# Patient Record
Sex: FEMALE | Race: WHITE | Employment: FULL TIME | URBAN - METROPOLITAN AREA
[De-identification: names, ages, dates, MRNs, and addresses within clinical notes are randomized per-mention and may not be internally consistent; named-entity substitution may affect disease eponyms.]

---

## 2017-09-02 ENCOUNTER — HOSPITAL ENCOUNTER (OUTPATIENT)
Dept: CT IMAGING | Age: 57
Discharge: HOME | End: 2017-09-02
Attending: INTERNAL MEDICINE
Payer: COMMERCIAL

## 2017-09-02 DIAGNOSIS — C50.511 MALIGNANT NEOPLASM OF LOWER-OUTER QUADRANT OF RIGHT FEMALE BREAST (HCC): ICD-10-CM

## 2017-09-02 PROCEDURE — 74177 CT ABD & PELVIS W/CONTRAST: CPT

## 2017-09-02 PROCEDURE — 74011636320 HC RX REV CODE- 636/320: Performed by: INTERNAL MEDICINE

## 2017-09-02 RX ADMIN — IOPAMIDOL 97 ML: 755 INJECTION, SOLUTION INTRAVENOUS at 10:00

## 2018-02-06 ENCOUNTER — HOSPITAL ENCOUNTER (OUTPATIENT)
Dept: MAMMOGRAPHY | Age: 58
Discharge: HOME | End: 2018-02-06
Attending: INTERNAL MEDICINE
Payer: COMMERCIAL

## 2018-02-06 DIAGNOSIS — C50.511 MALIGNANT NEOPLASM OF LOWER-OUTER QUADRANT OF RIGHT FEMALE BREAST (HCC): ICD-10-CM

## 2018-02-06 PROCEDURE — 77063 BREAST TOMOSYNTHESIS BI: CPT

## 2018-02-24 ENCOUNTER — HOSPITAL ENCOUNTER (OUTPATIENT)
Dept: ULTRASOUND IMAGING | Age: 58
Discharge: HOME | End: 2018-02-24
Attending: INTERNAL MEDICINE
Payer: COMMERCIAL

## 2018-02-24 DIAGNOSIS — K21.9 GERD (GASTROESOPHAGEAL REFLUX DISEASE): ICD-10-CM

## 2018-02-24 DIAGNOSIS — Z87.19 HISTORY OF PANCREATITIS: ICD-10-CM

## 2018-02-24 DIAGNOSIS — R10.9 ABDOMINAL PAIN: ICD-10-CM

## 2018-02-24 DIAGNOSIS — C50.919 BREAST CANCER (HCC): ICD-10-CM

## 2018-02-24 PROCEDURE — 76705 ECHO EXAM OF ABDOMEN: CPT

## 2018-10-15 ENCOUNTER — OFFICE VISIT (OUTPATIENT)
Dept: SURGERY | Age: 58
End: 2018-10-15

## 2018-10-15 VITALS
HEIGHT: 66 IN | HEART RATE: 68 BPM | SYSTOLIC BLOOD PRESSURE: 115 MMHG | WEIGHT: 148 LBS | BODY MASS INDEX: 23.78 KG/M2 | DIASTOLIC BLOOD PRESSURE: 72 MMHG

## 2018-10-15 DIAGNOSIS — R07.89 CHEST WALL PAIN: Primary | ICD-10-CM

## 2018-10-15 DIAGNOSIS — Z85.3 HISTORY OF RIGHT BREAST CANCER: ICD-10-CM

## 2018-10-15 RX ORDER — METOPROLOL SUCCINATE 25 MG/1
TABLET, EXTENDED RELEASE ORAL
Refills: 11 | COMMUNITY
Start: 2018-08-01

## 2018-10-15 NOTE — PROGRESS NOTES
HISTORY OF PRESENT ILLNESS  Braulio Barrett is a 65 y.o. female. HPI  Patient consult here for RIGHT breast pain and lump. Patient had BILATERAL breast pain about a month ago. The LEFT breast pain went away but the RIGHT breast pain continued. The pain is a 10/10 when she has it. The pain comes and goes. She noticed a lump to the RIGHT breast about 1 1/2 weeks ago. The lump is not hard but is painful to touch. No redness. Denies nipple discharge but has noticed the RIGHT nipple shape has changed.      09: RIGHT lumpectomy and SLNBx. PATH: 1.1cm infiltrating mammary carcinoma with mixed ductal and lobular features, grade II, ER+(100%)/MN+(100%)/HER2-, negative margins, 1/5 LNs involved. DCIS present, ER-/MN-. Pt is still taking Tamoxifen as of Oct. 2018.     Denies family history of breast or ovarian cancer. Mammogram, 18, BIRADS 1    Past Medical History:   Diagnosis Date    Arrhythmia     palpitations    Breast cancer ([facility] [address]     r-idc chemo    Cancer ([facility] [address] 3/09    RIGHT breast chemo and radiation    Depression     Radiation therapy complication 09    & chemo       Past Surgical History:   Procedure Laterality Date    HX BREAST LUMPECTOMY  3/09    RIGHT -idc chemo    HX  SECTION      x2    HX HYSTERECTOMY  2016    HX OOPHORECTOMY  2016       Social History     Social History    Marital status:      Spouse name: N/A    Number of children: N/A    Years of education: N/A     Occupational History    Not on file.      Social History Main Topics    Smoking status: Never Smoker    Smokeless tobacco: Never Used    Alcohol use 0.5 oz/week     1 Standard drinks or equivalent per week    Drug use: No    Sexual activity: Yes     Partners: Male     Other Topics Concern    Not on file     Social History Narrative       Current Outpatient Prescriptions on File Prior to Visit   Medication Sig Dispense Refill    tamoxifen (NOLVADEX) 20 mg tablet Take 20 mg by mouth daily. No current facility-administered medications on file prior to visit. Allergies   Allergen Reactions    Percocet [Oxycodone-Acetaminophen] Swelling     Swelling of the tongue. Tolerates all other opiods. OB History      Para Term  AB Living    2 2    2    SAB TAB Ectopic Molar Multiple Live Births                 ROS  Constitutional: Negative. HENT: Negative. Eyes: Negative. Respiratory: Negative. Cardiovascular: Negative. Gastrointestinal: Negative. Genitourinary: Negative. Musculoskeletal: Negative. Skin: Negative. Neurological: Negative. Endo/Heme/Allergies: Negative. Psychiatric/Behavioral: Negative. Physical Exam   Cardiovascular: Normal rate, regular rhythm and normal heart sounds. Pulmonary/Chest: Breath sounds normal. Right breast exhibits no inverted nipple, no mass, no nipple discharge, no skin change and no tenderness. Left breast exhibits no inverted nipple, no mass, no nipple discharge, no skin change and no tenderness. Breasts are symmetrical.   Lymphadenopathy:        Right cervical: No superficial cervical, no deep cervical and no posterior cervical adenopathy present. Left cervical: No superficial cervical, no deep cervical and no posterior cervical adenopathy present. She has no axillary adenopathy. Right axillary: No pectoral and no lateral adenopathy present. Left axillary: No pectoral and no lateral adenopathy present. BREAST ULTRASOUND  Indication: Right  Breast pain  Technique: The area was scanned using a high-frequency linear-array near-field transducer  Findings: No abnormal mass, lesion, or shadowing noted. No cysts  Impression: Normal breast tissue   Disposition: No worrisome finding on ultrasound    ASSESSMENT and PLAN    ICD-10-CM ICD-9-CM    1. Chest wall pain R07.89 786.52    2.  History of right breast cancer Z85.3 V10.3       New patient with hx of RIGHT breast cancer presents for evaluation of RIGHT breast pain near lumpectomy scar. Physical exam today normal. Well healed incision s/p lumpectomy no evidence of local recurrence. No worrisome findings on US. Pain recreated on exam, which is chest wall pain, likely from XRT boost. Assured pt that this is not related to her breast cancer. Pt notes allergy to NSAIDs, but thinks she can take Aleve. Advised her to treat for a few days with Aleve. F/U PRN. This plan was reviewed with the patient and patient agrees. All questions were answered.     Written by Castillo Hauser, as dictated by Dr. Tawana Wylie MD.

## 2018-10-15 NOTE — PROGRESS NOTES
HISTORY OF PRESENT ILLNESS Azar Mallory is a 65 y.o. female. HPI  Patient consult here for RIGHT breast pain and lump. Patient had BILATERAL breast pain about a month ago. The LEFT breast pain went away but the RIGHT breast pain continued. The pain is a 10/10 when she has it. The pain comes and goes. She noticed a lump to the RIGHT breast about 1 1/2 weeks ago. The lump is not hard but is painful to touch. No redness. Denies nipple discharge but has noticed the RIGHT nipple shape has changed. 03/03/09: RIGHT lumpectomy and SLNBx. PATH: 1.1cm infiltrating mammary carcinoma with mixed ductal and lobular features, grade II, ER+(100%)/AL+(100%)/HER2-, negative margins, 1/5 LNs involved. DCIS present, ER-/AL-. Denies family history of breast or ovarian cancer. Mammogram, 2/6/18, BIRADS 1 Review of Systems Constitutional: Negative. HENT: Negative. Eyes: Negative. Respiratory: Negative. Cardiovascular: Negative. Gastrointestinal: Negative. Genitourinary: Negative. Musculoskeletal: Negative. Skin: Negative. Neurological: Negative. Endo/Heme/Allergies: Negative. Psychiatric/Behavioral: Negative. Physical Exam 
 
ASSESSMENT and PLAN 
{ASSESSMENT/PLAN:16719}

## 2018-10-15 NOTE — PATIENT INSTRUCTIONS
Breast Self-Exam: Care Instructions  Your Care Instructions    A breast self-exam is when you check your breasts for lumps or changes. This regular exam helps you learn how your breasts normally look and feel. Most breast problems or changes are not because of cancer. Breast self-exam is not a substitute for a mammogram. Having regular breast exams by your doctor and regular mammograms improve your chances of finding any problems with your breasts. Some women set a time each month to do a step-by-step breast self-exam. Other women like a less formal system. They might look at their breasts as they brush their teeth, or feel their breasts once in a while in the shower. If you notice a change in your breast, tell your doctor. Follow-up care is a key part of your treatment and safety. Be sure to make and go to all appointments, and call your doctor if you are having problems. It's also a good idea to know your test results and keep a list of the medicines you take. How do you do a breast self-exam?  · The best time to examine your breasts is usually one week after your menstrual period begins. Your breasts should not be tender then. If you do not have periods, you might do your exam on a day of the month that is easy to remember. · To examine your breasts:  ¨ Remove all your clothes above the waist and lie down. When you are lying down, your breast tissue spreads evenly over your chest wall, which makes it easier to feel all your breast tissue. ¨ Use the pads--not the fingertips--of the 3 middle fingers of your left hand to check your right breast. Move your fingers slowly in small coin-sized circles that overlap. ¨ Use three levels of pressure to feel of all your breast tissue. Use light pressure to feel the tissue close to the skin surface. Use medium pressure to feel a little deeper. Use firm pressure to feel your tissue close to your breastbone and ribs.  Use each pressure level to feel your breast tissue before moving on to the next spot. ¨ Check your entire breast, moving up and down as if following a strip from the collarbone to the bra line, and from the armpit to the ribs. Repeat until you have covered the entire breast.  ¨ Repeat this procedure for your left breast, using the pads of the 3 middle fingers of your right hand. · To examine your breasts while in the shower:  ¨ Place one arm over your head and lightly soap your breast on that side. ¨ Using the pads of your fingers, gently move your hand over your breast (in the strip pattern described above), feeling carefully for any lumps or changes. ¨ Repeat for the other breast.  · Have your doctor inspect anything you notice to see if you need further testing. Where can you learn more? Go to http://inocente-lakesha.info/. Enter P148 in the search box to learn more about \"Breast Self-Exam: Care Instructions. \"  Current as of: March 28, 2018  Content Version: 11.8  © 9843-3856 [employer]. Care instructions adapted under license by [employer] (which disclaims liability or warranty for this information). If you have questions about a medical condition or this instruction, always ask your healthcare professional. [place] [address] any warranty or liability for your use of this information.

## 2018-10-15 NOTE — LETTER
10/16/2018 3:42 PM 
 
Patient:  Chayo Cannon YOB: 1960 Date of Visit: 10/15/2018 Dear Dr. Veronica Cain: Thank you for referring Ms. Power Chino to me for evaluation/treatment. Below are the relevant portions of my assessment and plan of care. HISTORY OF PRESENT ILLNESS Chayo Cannon is a 65 y.o. female. HPI Patient consult here for RIGHT breast pain and lump. Patient had BILATERAL breast pain about a month ago. The LEFT breast pain went away but the RIGHT breast pain continued. The pain is a 10/10 when she has it. The pain comes and goes. She noticed a lump to the RIGHT breast about 1 1/2 weeks ago. The lump is not hard but is painful to touch. No redness. Denies nipple discharge but has noticed the RIGHT nipple shape has changed.  
  
09: RIGHT lumpectomy and SLNBx. PATH: 1.1cm infiltrating mammary carcinoma with mixed ductal and lobular features, grade II, ER+(100%)/WV+(100%)/HER2-, negative margins, 1/5 LNs involved. DCIS present, ER-/WV-. Pt is still taking Tamoxifen as of Oct. 2018. 
  
Denies family history of breast or ovarian cancer. Mammogram, 18, BIRADS 1 Past Medical History:  
Diagnosis Date  Arrhythmia   
 palpitations  Breast cancer ([facility] [address]   
 r-idc chemo  Cancer ([facility] [address] 3/09 RIGHT breast chemo and radiation  Depression  Radiation therapy complication 5981  
 & chemo Past Surgical History:  
Procedure Laterality Date  HX BREAST LUMPECTOMY  3/09 RIGHT -idc chemo  HX  SECTION    
 x2  
 HX HYSTERECTOMY  2016  HX OOPHORECTOMY  2016 Social History Social History  Marital status:  Spouse name: N/A  
 Number of children: N/A  
 Years of education: N/A Occupational History  Not on file. Social History Main Topics  Smoking status: Never Smoker  Smokeless tobacco: Never Used  Alcohol use 0.5 oz/week 1 Standard drinks or equivalent per week  Drug use: No  
 Sexual activity: Yes  
  Partners: Male Other Topics Concern  Not on file Social History Narrative Current Outpatient Prescriptions on File Prior to Visit Medication Sig Dispense Refill  tamoxifen (NOLVADEX) 20 mg tablet Take 20 mg by mouth daily. No current facility-administered medications on file prior to visit. Allergies Allergen Reactions  Percocet [Oxycodone-Acetaminophen] Swelling Swelling of the tongue. Tolerates all other opiods. OB History  Para Term  AB Living 2 2    2 SAB TAB Ectopic Molar Multiple Live Births ROS Constitutional: Negative. HENT: Negative. Eyes: Negative. Respiratory: Negative. Cardiovascular: Negative. Gastrointestinal: Negative. Genitourinary: Negative. Musculoskeletal: Negative. Skin: Negative. Neurological: Negative. Endo/Heme/Allergies: Negative. Psychiatric/Behavioral: Negative. Physical Exam  
Cardiovascular: Normal rate, regular rhythm and normal heart sounds. Pulmonary/Chest: Breath sounds normal. Right breast exhibits no inverted nipple, no mass, no nipple discharge, no skin change and no tenderness. Left breast exhibits no inverted nipple, no mass, no nipple discharge, no skin change and no tenderness. Breasts are symmetrical.  
Lymphadenopathy:  
     Right cervical: No superficial cervical, no deep cervical and no posterior cervical adenopathy present. Left cervical: No superficial cervical, no deep cervical and no posterior cervical adenopathy present. She has no axillary adenopathy. Right axillary: No pectoral and no lateral adenopathy present. Left axillary: No pectoral and no lateral adenopathy present. BREAST ULTRASOUND Indication: Right  Breast pain Technique: The area was scanned using a high-frequency linear-array near-field transducer Findings: No abnormal mass, lesion, or shadowing noted. No cysts Impression: Normal breast tissue Disposition: No worrisome finding on ultrasound ASSESSMENT and PLAN 
  ICD-10-CM ICD-9-CM 1. Chest wall pain R07.89 786.52   
2. History of right breast cancer Z85.3 V10.3 New patient with hx of RIGHT breast cancer presents for evaluation of RIGHT breast pain near lumpectomy scar. Physical exam today normal. Well healed incision s/p lumpectomy no evidence of local recurrence. No worrisome findings on US. Pain recreated on exam, which is chest wall pain, likely from XRT boost. Assured pt that this is not related to her breast cancer. Pt notes allergy to NSAIDs, but thinks she can take Aleve. Advised her to treat for a few days with Aleve. F/U PRN. This plan was reviewed with the patient and patient agrees. All questions were answered. Written by Ever Cruz, as dictated by Dr. Rosy Matias MD. 
[address] [place] [address] Braulio Barrett is a 65 y.o. female. HPI Patient consult here for RIGHT breast pain and lump. Patient had BILATERAL breast pain about a month ago. The LEFT breast pain went away but the RIGHT breast pain continued. The pain is a 10/10 when she has it. The pain comes and goes. She noticed a lump to the RIGHT breast about 1 1/2 weeks ago. The lump is not hard but is painful to touch. No redness. Denies nipple discharge but has noticed the RIGHT nipple shape has changed.  
  
03/03/09: RIGHT lumpectomy and SLNBx. PATH: 1.1cm infiltrating mammary carcinoma with mixed ductal and lobular features, grade II, ER+(100%)/NV+(100%)/HER2-, negative margins, 1/5 LNs involved. DCIS present, ER-/NV-. Pt is still taking Tamoxifen as of Oct. 2018. 
  
Denies family history of breast or ovarian cancer. Mammogram, 2/6/18, BIRADS 1 Past Medical History:  
Diagnosis Date  Arrhythmia   
 palpitations  Breast cancer ([facility] [address] 2009  
 r-idc chemo  Cancer ([facility] [address] 3/09 RIGHT breast chemo and radiation  Depression  Radiation therapy complication 1506  
 & chemo Past Surgical History:  
Procedure Laterality Date  HX BREAST LUMPECTOMY  3/09 RIGHT -idc chemo  HX  SECTION    
 x2  
 HX HYSTERECTOMY  2016  HX OOPHORECTOMY  2016 Social History Social History  Marital status:  Spouse name: N/A  
 Number of children: N/A  
 Years of education: N/A Occupational History  Not on file. Social History Main Topics  Smoking status: Never Smoker  Smokeless tobacco: Never Used  Alcohol use 0.5 oz/week 1 Standard drinks or equivalent per week  Drug use: No  
 Sexual activity: Yes  
  Partners: Male Other Topics Concern  Not on file Social History Narrative Current Outpatient Prescriptions on File Prior to Visit Medication Sig Dispense Refill  tamoxifen (NOLVADEX) 20 mg tablet Take 20 mg by mouth daily. No current facility-administered medications on file prior to visit. Allergies Allergen Reactions  Percocet [Oxycodone-Acetaminophen] Swelling Swelling of the tongue. Tolerates all other opiods. OB History  Para Term  AB Living 2 2    2 SAB TAB Ectopic Molar Multiple Live Births ROS Constitutional: Negative. HENT: Negative. Eyes: Negative. Respiratory: Negative. Cardiovascular: Negative. Gastrointestinal: Negative. Genitourinary: Negative. Musculoskeletal: Negative. Skin: Negative. Neurological: Negative. Endo/Heme/Allergies: Negative. Psychiatric/Behavioral: Negative. Physical Exam  
Cardiovascular: Normal rate, regular rhythm and normal heart sounds. Pulmonary/Chest: Breath sounds normal. Right breast exhibits no inverted nipple, no mass, no nipple discharge, no skin change and no tenderness. Left breast exhibits no inverted nipple, no mass, no nipple discharge, no skin change and no tenderness. Breasts are symmetrical.  
Lymphadenopathy:  
     Right cervical: No superficial cervical, no deep cervical and no posterior cervical adenopathy present. Left cervical: No superficial cervical, no deep cervical and no posterior cervical adenopathy present. She has no axillary adenopathy. Right axillary: No pectoral and no lateral adenopathy present. Left axillary: No pectoral and no lateral adenopathy present. BREAST ULTRASOUND Indication: Right  Breast pain Technique: The area was scanned using a high-frequency linear-array near-field transducer Findings: No abnormal mass, lesion, or shadowing noted. No cysts Impression: Normal breast tissue Disposition: No worrisome finding on ultrasound ASSESSMENT and PLAN 
  ICD-10-CM ICD-9-CM 1. Chest wall pain R07.89 786.52   
2. History of right breast cancer Z85.3 V10.3 New patient with hx of RIGHT breast cancer presents for evaluation of RIGHT breast pain near lumpectomy scar. Physical exam today normal. Well healed incision s/p lumpectomy no evidence of local recurrence. No worrisome findings on US. Pain recreated on exam, which is chest wall pain, likely from XRT boost. Assured pt that this is not related to her breast cancer. Pt notes allergy to NSAIDs, but thinks she can take Aleve. Advised her to treat for a few days with Aleve. F/U PRN. This plan was reviewed with the patient and patient agrees. All questions were answered. Written by Norbert Escobar, as dictated by Dr. Cheyenne Irvin MD. 
 
 
If you have questions, please do not hesitate to call me. I look forward to following Ms. Reggie Wang along with you.  
 
 
 
Sincerely, 
 
 
Cristine Tierney MD

## 2018-10-16 NOTE — COMMUNICATION BODY
HISTORY OF PRESENT ILLNESS  Vickey Russell is a 65 y.o. female. HPI  Patient consult here for RIGHT breast pain and lump. Patient had BILATERAL breast pain about a month ago. The LEFT breast pain went away but the RIGHT breast pain continued. The pain is a 10/10 when she has it. The pain comes and goes. She noticed a lump to the RIGHT breast about 1 1/2 weeks ago. The lump is not hard but is painful to touch. No redness. Denies nipple discharge but has noticed the RIGHT nipple shape has changed.      09: RIGHT lumpectomy and SLNBx. PATH: 1.1cm infiltrating mammary carcinoma with mixed ductal and lobular features, grade II, ER+(100%)/WA+(100%)/HER2-, negative margins, 1/5 LNs involved. DCIS present, ER-/WA-. Pt is still taking Tamoxifen as of Oct. 2018.     Denies family history of breast or ovarian cancer. Mammogram, 18, BIRADS 1    Past Medical History:   Diagnosis Date    Arrhythmia     palpitations    Breast cancer ([facility]     r-idc chemo    Cancer ([facility] [facility] 3/09    RIGHT breast chemo and radiation    Depression     Radiation therapy complication 8122    & chemo       Past Surgical History:   Procedure Laterality Date    HX BREAST LUMPECTOMY  3/09    RIGHT -idc chemo    HX  SECTION      x2    HX HYSTERECTOMY  2016    HX OOPHORECTOMY  2016       Social History     Social History    Marital status:      Spouse name: N/A    Number of children: N/A    Years of education: N/A     Occupational History    Not on file.      Social History Main Topics    Smoking status: Never Smoker    Smokeless tobacco: Never Used    Alcohol use 0.5 oz/week     1 Standard drinks or equivalent per week    Drug use: No    Sexual activity: Yes     Partners: Male     Other Topics Concern    Not on file     Social History Narrative       Current Outpatient Prescriptions on File Prior to Visit   Medication Sig Dispense Refill    tamoxifen (NOLVADEX) 20 mg tablet Take 20 mg by mouth daily. No current facility-administered medications on file prior to visit. Allergies   Allergen Reactions    Percocet [Oxycodone-Acetaminophen] Swelling     Swelling of the tongue. Tolerates all other opiods. OB History      Para Term  AB Living    2 2    2    SAB TAB Ectopic Molar Multiple Live Births                 ROS  Constitutional: Negative. HENT: Negative. Eyes: Negative. Respiratory: Negative. Cardiovascular: Negative. Gastrointestinal: Negative. Genitourinary: Negative. Musculoskeletal: Negative. Skin: Negative. Neurological: Negative. Endo/Heme/Allergies: Negative. Psychiatric/Behavioral: Negative. Physical Exam   Cardiovascular: Normal rate, regular rhythm and normal heart sounds. Pulmonary/Chest: Breath sounds normal. Right breast exhibits no inverted nipple, no mass, no nipple discharge, no skin change and no tenderness. Left breast exhibits no inverted nipple, no mass, no nipple discharge, no skin change and no tenderness. Breasts are symmetrical.   Lymphadenopathy:        Right cervical: No superficial cervical, no deep cervical and no posterior cervical adenopathy present. Left cervical: No superficial cervical, no deep cervical and no posterior cervical adenopathy present. She has no axillary adenopathy. Right axillary: No pectoral and no lateral adenopathy present. Left axillary: No pectoral and no lateral adenopathy present. BREAST ULTRASOUND  Indication: Right  Breast pain  Technique: The area was scanned using a high-frequency linear-array near-field transducer  Findings: No abnormal mass, lesion, or shadowing noted. No cysts  Impression: Normal breast tissue   Disposition: No worrisome finding on ultrasound    ASSESSMENT and PLAN    ICD-10-CM ICD-9-CM    1. Chest wall pain R07.89 786.52    2.  History of right breast cancer Z85.3 V10.3       New patient with hx of RIGHT breast cancer presents for evaluation of RIGHT breast pain near lumpectomy scar. Physical exam today normal. Well healed incision s/p lumpectomy no evidence of local recurrence. No worrisome findings on US. Pain recreated on exam, which is chest wall pain, likely from XRT boost. Assured pt that this is not related to her breast cancer. Pt notes allergy to NSAIDs, but thinks she can take Aleve. Advised her to treat for a few days with Aleve. F/U PRN. This plan was reviewed with the patient and patient agrees. All questions were answered. Written by Jasper Park, as dictated by Dr. Melia Kussmaul, MD.  Baron Quezada is a 65 y.o. female. HPI  Patient consult here for RIGHT breast pain and lump. Patient had BILATERAL breast pain about a month ago. The LEFT breast pain went away but the RIGHT breast pain continued. The pain is a 10/10 when she has it. The pain comes and goes. She noticed a lump to the RIGHT breast about 1 1/2 weeks ago. The lump is not hard but is painful to touch. No redness. Denies nipple discharge but has noticed the RIGHT nipple shape has changed.      09: RIGHT lumpectomy and SLNBx. PATH: 1.1cm infiltrating mammary carcinoma with mixed ductal and lobular features, grade II, ER+(100%)/NC+(100%)/HER2-, negative margins, 1/5 LNs involved. DCIS present, ER-/NC-. Pt is still taking Tamoxifen as of Oct. 2018.     Denies family history of breast or ovarian cancer.      Mammogram, 18, BIRADS 1    Past Medical History:   Diagnosis Date    Arrhythmia     palpitations    Breast cancer ([address]     r-idc chemo    Cancer ([facility] [address] 3/09    RIGHT breast chemo and radiation    Depression     Radiation therapy complication 6150    & chemo       Past Surgical History:   Procedure Laterality Date    HX BREAST LUMPECTOMY  3/09    RIGHT -idc chemo    HX  SECTION      x2    HX HYSTERECTOMY  2016    HX OOPHORECTOMY  2016       Social History Social History    Marital status:      Spouse name: N/A    Number of children: N/A    Years of education: N/A     Occupational History    Not on file. Social History Main Topics    Smoking status: Never Smoker    Smokeless tobacco: Never Used    Alcohol use 0.5 oz/week     1 Standard drinks or equivalent per week    Drug use: No    Sexual activity: Yes     Partners: Male     Other Topics Concern    Not on file     Social History Narrative       Current Outpatient Prescriptions on File Prior to Visit   Medication Sig Dispense Refill    tamoxifen (NOLVADEX) 20 mg tablet Take 20 mg by mouth daily. No current facility-administered medications on file prior to visit. Allergies   Allergen Reactions    Percocet [Oxycodone-Acetaminophen] Swelling     Swelling of the tongue. Tolerates all other opiods. OB History      Para Term  AB Living    2 2    2    SAB TAB Ectopic Molar Multiple Live Births                 ROS  Constitutional: Negative. HENT: Negative. Eyes: Negative. Respiratory: Negative. Cardiovascular: Negative. Gastrointestinal: Negative. Genitourinary: Negative. Musculoskeletal: Negative. Skin: Negative. Neurological: Negative. Endo/Heme/Allergies: Negative. Psychiatric/Behavioral: Negative. Physical Exam   Cardiovascular: Normal rate, regular rhythm and normal heart sounds. Pulmonary/Chest: Breath sounds normal. Right breast exhibits no inverted nipple, no mass, no nipple discharge, no skin change and no tenderness. Left breast exhibits no inverted nipple, no mass, no nipple discharge, no skin change and no tenderness. Breasts are symmetrical.   Lymphadenopathy:        Right cervical: No superficial cervical, no deep cervical and no posterior cervical adenopathy present. Left cervical: No superficial cervical, no deep cervical and no posterior cervical adenopathy present. She has no axillary adenopathy. Right axillary: No pectoral and no lateral adenopathy present. Left axillary: No pectoral and no lateral adenopathy present. BREAST ULTRASOUND  Indication: Right  Breast pain  Technique: The area was scanned using a high-frequency linear-array near-field transducer  Findings: No abnormal mass, lesion, or shadowing noted. No cysts  Impression: Normal breast tissue   Disposition: No worrisome finding on ultrasound    ASSESSMENT and PLAN    ICD-10-CM ICD-9-CM    1. Chest wall pain R07.89 786.52    2. History of right breast cancer Z85.3 V10.3       New patient with hx of RIGHT breast cancer presents for evaluation of RIGHT breast pain near lumpectomy scar. Physical exam today normal. Well healed incision s/p lumpectomy no evidence of local recurrence. No worrisome findings on US. Pain recreated on exam, which is chest wall pain, likely from XRT boost. Assured pt that this is not related to her breast cancer. Pt notes allergy to NSAIDs, but thinks she can take Aleve. Advised her to treat for a few days with Aleve. F/U PRN. This plan was reviewed with the patient and patient agrees. All questions were answered.     Written by Prakash Ponce, as dictated by Dr. Ramona Chávez MD.

## 2019-02-07 ENCOUNTER — HOSPITAL ENCOUNTER (OUTPATIENT)
Dept: MAMMOGRAPHY | Age: 59
Discharge: HOME | End: 2019-02-07
Attending: INTERNAL MEDICINE
Payer: COMMERCIAL

## 2019-02-07 DIAGNOSIS — Z12.31 ENCOUNTER FOR SCREENING MAMMOGRAM FOR MALIGNANT NEOPLASM OF BREAST: ICD-10-CM

## 2019-02-07 PROCEDURE — 77063 BREAST TOMOSYNTHESIS BI: CPT

## 2019-02-07 PROCEDURE — 77067 SCR MAMMO BI INCL CAD: CPT

## 2022-07-13 RX ORDER — ACETAMINOPHEN 500 MG
1000 TABLET ORAL
COMMUNITY

## 2022-07-15 ENCOUNTER — HOSPITAL ENCOUNTER (OUTPATIENT)
Age: 62
Setting detail: OUTPATIENT SURGERY
Discharge: HOME | End: 2022-07-15
Attending: INTERNAL MEDICINE | Admitting: INTERNAL MEDICINE
Payer: COMMERCIAL

## 2022-07-15 VITALS
DIASTOLIC BLOOD PRESSURE: 66 MMHG | BODY MASS INDEX: 26.61 KG/M2 | HEIGHT: 66 IN | WEIGHT: 165.57 LBS | OXYGEN SATURATION: 100 % | HEART RATE: 66 BPM | TEMPERATURE: 97.5 F | RESPIRATION RATE: 23 BRPM | SYSTOLIC BLOOD PRESSURE: 125 MMHG

## 2022-07-15 LAB
APPEARANCE FLD: ABNORMAL
COLOR FLD: COLORLESS
LYMPHOCYTES NFR FLD: 4 %
MESOTHL CELL NFR FLD: 77 %
MONOS+MACROS NFR FLD: 18 %
NEUTROPHILS NFR FLD: 1 %
NUC CELL # FLD: 110 /CU MM
RBC # FLD: 11 /CU MM
SPECIMEN SOURCE FLD: ABNORMAL

## 2022-07-15 PROCEDURE — 87280 RESPIRATORY SYNCYTIAL AG IF: CPT

## 2022-07-15 PROCEDURE — 76040000019: Performed by: INTERNAL MEDICINE

## 2022-07-15 PROCEDURE — 87252 VIRUS INOCULATION TISSUE: CPT

## 2022-07-15 PROCEDURE — 74011000250 HC RX REV CODE- 250: Performed by: INTERNAL MEDICINE

## 2022-07-15 PROCEDURE — 87278 LEGION PNEUMOPHILIA AG IF: CPT

## 2022-07-15 PROCEDURE — 2709999900 HC NON-CHARGEABLE SUPPLY: Performed by: INTERNAL MEDICINE

## 2022-07-15 PROCEDURE — 87070 CULTURE OTHR SPECIMN AEROBIC: CPT

## 2022-07-15 PROCEDURE — 88112 CYTOPATH CELL ENHANCE TECH: CPT

## 2022-07-15 PROCEDURE — 89050 BODY FLUID CELL COUNT: CPT

## 2022-07-15 PROCEDURE — 87102 FUNGUS ISOLATION CULTURE: CPT

## 2022-07-15 PROCEDURE — 87116 MYCOBACTERIA CULTURE: CPT

## 2022-07-15 PROCEDURE — 74011250636 HC RX REV CODE- 250/636: Performed by: INTERNAL MEDICINE

## 2022-07-15 RX ORDER — FLUMAZENIL 0.1 MG/ML
0.2 INJECTION INTRAVENOUS
Status: DISCONTINUED | OUTPATIENT
Start: 2022-07-15 | End: 2022-07-15 | Stop reason: HOSPADM

## 2022-07-15 RX ORDER — SODIUM CHLORIDE 0.9 % (FLUSH) 0.9 %
5-40 SYRINGE (ML) INJECTION AS NEEDED
Status: DISCONTINUED | OUTPATIENT
Start: 2022-07-15 | End: 2022-07-15 | Stop reason: HOSPADM

## 2022-07-15 RX ORDER — FENTANYL CITRATE 50 UG/ML
200 INJECTION, SOLUTION INTRAMUSCULAR; INTRAVENOUS
Status: DISCONTINUED | OUTPATIENT
Start: 2022-07-15 | End: 2022-07-15 | Stop reason: HOSPADM

## 2022-07-15 RX ORDER — LIDOCAINE HYDROCHLORIDE 40 MG/ML
SOLUTION TOPICAL ONCE
Status: COMPLETED | OUTPATIENT
Start: 2022-07-15 | End: 2022-07-15

## 2022-07-15 RX ORDER — NALOXONE HYDROCHLORIDE 0.4 MG/ML
0.4 INJECTION, SOLUTION INTRAMUSCULAR; INTRAVENOUS; SUBCUTANEOUS
Status: DISCONTINUED | OUTPATIENT
Start: 2022-07-15 | End: 2022-07-15 | Stop reason: HOSPADM

## 2022-07-15 RX ORDER — LIDOCAINE HYDROCHLORIDE 20 MG/ML
20 INJECTION, SOLUTION INFILTRATION; PERINEURAL ONCE
Status: COMPLETED | OUTPATIENT
Start: 2022-07-15 | End: 2022-07-15

## 2022-07-15 RX ORDER — EPINEPHRINE 1 MG/ML
1 INJECTION, SOLUTION, CONCENTRATE INTRAVENOUS ONCE
Status: DISCONTINUED | OUTPATIENT
Start: 2022-07-15 | End: 2022-07-15 | Stop reason: HOSPADM

## 2022-07-15 RX ORDER — SODIUM CHLORIDE 0.9 % (FLUSH) 0.9 %
5-40 SYRINGE (ML) INJECTION EVERY 8 HOURS
Status: DISCONTINUED | OUTPATIENT
Start: 2022-07-15 | End: 2022-07-15 | Stop reason: HOSPADM

## 2022-07-15 RX ORDER — MIDAZOLAM HYDROCHLORIDE 1 MG/ML
5 INJECTION, SOLUTION INTRAMUSCULAR; INTRAVENOUS
Status: DISCONTINUED | OUTPATIENT
Start: 2022-07-15 | End: 2022-07-15 | Stop reason: HOSPADM

## 2022-07-15 RX ADMIN — LIDOCAINE HYDROCHLORIDE 5 ML: 40 SOLUTION ORAL at 07:59

## 2022-07-15 RX ADMIN — LIDOCAINE HYDROCHLORIDE 12 MG: 20 INJECTION, SOLUTION INFILTRATION; PERINEURAL at 08:11

## 2022-07-15 NOTE — DISCHARGE INSTRUCTIONS
[facility] [address] [place] [address], [address]                  Rosas Donohue  [id number]  1960         DISCHARGE INSTRUCTIONS  Discomfort:  Sore throat- throat lozenges or warm salt water gargle  redness at IV site- apply warm compress to area; if redness or soreness persist- contact your physician  Gaseous discomfort- walking, belching will help relieve any discomfort  You may not operate a vehicle for 12 hours  You may not engage in an occupation involving machinery or appliances for rest of today  You may not drink alcoholic beverages for at least 12 hours  Avoid making any critical decisions for at least 24 hour  Blood tinged secretions - this should stop within 2-3 hours  DIET  Nothing by mouth- do not eat or drink for two hours. You may eat and drink after 10:00am              You may resume your regular diet - however -  remember your colon is empty                  and a heavy meal will produce gas. Avoid these foods:  vegetables, fried / greasy foods, carbonated drinks  ACTIVITY  You may resume your normal daily activities however it is recommended that you spend the remainder of the day resting -  avoid any strenuous activity. CALL M.D.   ANY SIGN OF                                         Increasing pain, nausea, vomiting  Abdominal distension (swelling)  New increased bleeding (oral or rectal)  Fever (chills)  Pain in chest area  Bloody discharge from nose or mouth  Shortness of breath     Call Dr. Robert Perez office for following  Results in 7-10 days  Telephone #  [phone]

## 2022-07-15 NOTE — DISCHARGE SUMMARY
[facility] [address] [place] [address], [address]  [id number]  1960       DISCHARGE INSTRUCTIONS  Discomfort:  Sore throat- throat lozenges or warm salt water gargle  redness at IV site- apply warm compress to area; if redness or soreness persist- contact your physician  Gaseous discomfort- walking, belching will help relieve any discomfort  You may not operate a vehicle for 12 hours  You may not engage in an occupation involving machinery or appliances for rest of today  You may not drink alcoholic beverages for at least 12 hours  Avoid making any critical decisions for at least 24 hour  Blood tinged secretions - this should stop within 2-3 hours  DIET  Nothing by mouth- do not eat or drink for two hours. You may eat and drink after 10:00am   You may resume your regular diet - however -  remember your colon is empty     and a heavy meal will produce gas. Avoid these foods:  vegetables, fried / greasy foods, carbonated drinks  ACTIVITY  You may resume your normal daily activities however it is recommended that you spend the remainder of the day resting -  avoid any strenuous activity. CALL M.D.   ANY SIGN OF   Increasing pain, nausea, vomiting  Abdominal distension (swelling)  New increased bleeding (oral or rectal)  Fever (chills)  Pain in chest area  Bloody discharge from nose or mouth  Shortness of breath    Call Dr. Kaylyn Salas office for following  Results in 7-10 days  Telephone #  [phone]

## 2022-07-15 NOTE — PERIOP NOTES
Initial RN admission and assessment performed and documented in Endoscopy navigator. Patient evaluated by pulmonologist in pre-procedure holding.     All procedural vital signs, airway assessment, and level of consciousness information monitored and recorded by endoscopy registered nurse

## 2022-07-15 NOTE — PROGRESS NOTES
Mya Jones  1960  [id number]    Situation:  Verbal report received from: Sarah Lyn, PASHA  Procedure: Procedure(s):  BRONCHOSCOPY  BRONCHIAL ALVEOLAR LAVAGE    Background:    Preoperative diagnosis: ABNORMAL CHEST CT  Postoperative diagnosis: abnormal chest CT    :  Dr. Jordan Nevarez  Assistant(s): Endoscopy Technician-1: Glenn Mora  Endoscopy RN-1: Olinda Miller RN    Specimens: * No specimens in log *  H. Pylori  no    Assessment:  Intra-procedure medications     Anesthesia gave intra-procedure sedation and medications, see anesthesia flow sheet yes    Intravenous fluids: NS@ KVO     Vital signs stable yes    Abdominal assessment: round and soft yes    Recommendation:  Discharge patient per MD order yes.   Return to floor na  Family or Friend family  Permission to share finding with family or friend yes

## 2022-07-15 NOTE — PERIOP NOTES
Patient received 3 of versed and 100 mcg of fentanyl. Out of Procedure and sent to post-recovery @ 0888    Post-recovery report given to Brooklyn Junior RN @ 2132    Patient ABD remains soft and non-tender post procedure. Pt has no complaints at this time and tolerated the procedure well. Endoscope was pre-cleaned at bedside immediately following procedure by Maico Hannon.

## 2022-07-15 NOTE — PROCEDURES
[facility]  [address]  [address]  [phone]    Jeanine Childers  1960  [id number]      Date of Procedure: 7/15/2022    Preoperative diagnosis: ABNORMAL CHEST CT    Procedure: Procedure(s):  BRONCHOSCOPY  BRONCHIAL ALVEOLAR LAVAGE    Indication: Abnormal CT chest    :  Suresh Robles MD    Assistant(s): Endoscopy Technician-1: Tona Lopez  Endoscopy RN-1: To Long RN    Anesthesia/Sedation:  Versed 3 mg IV and Fentanyl 100 mcg IV      Procedure Details:  After infomed consent was obtained for the procedure, with all risks and benefits of procedure explained the patient was taken to the endoscopy suite and placed in the supine position. Following sequential administration of sedation as per above, the bronchoscope was inserted into the mouth and advanced under direct vision to the lungs bilaterally. No gross anatomical or mucosal abnormalities noted. BAL performed in the RUL with return of approximately 60cc of cloudy fluid with small mucous plugs. Fluid sent for routine studies. Complications:   None noted; patient tolerated the procedure well.     EBL:  Minimal           Impression:    Abnormal CT chest, BAL performed    Recommendations:   Await study results  Discharge when meets criteria      Rose Calloway MD  7/15/2022  8:17 AM

## 2022-07-15 NOTE — PROGRESS NOTES
Endoscopy discharge instructions have been reviewed and given to patient. The patient verbalized understanding and acceptance of instructions. Dr. Norm Aguiar discussed with spouse procedure findings and next steps.

## 2022-07-15 NOTE — H&P
Please see scanned H&P under media section. No changes to the plan and will proceed with bronchoscopy.

## 2022-07-16 LAB
RSV AG SPEC QL IF: NEGATIVE
SPECIMEN SOURCE: NORMAL

## 2022-07-17 LAB
BACTERIA SPEC CULT: NORMAL
GRAM STN SPEC: NORMAL
SERVICE CMNT-IMP: NORMAL

## 2022-07-18 LAB
L PNEUMO AG SPEC QL IF: NEGATIVE
SPECIMEN SOURCE: NORMAL

## 2022-07-24 LAB
SPECIMEN SOURCE: NORMAL
VIRUS SPEC CULT: NORMAL

## 2022-07-27 LAB
LEGIONELLA SPEC CULT: NORMAL
LEGIONELLA SPEC CULT: NORMAL
SPECIMEN SOURCE: NORMAL

## 2022-08-15 LAB
BACTERIA SPEC CULT: NORMAL
SERVICE CMNT-IMP: NORMAL

## 2022-09-01 LAB
ACID FAST STN SPEC: NEGATIVE
MYCOBACTERIUM SPEC QL CULT: NEGATIVE
SPECIMEN PREPARATION: NORMAL
SPECIMEN SOURCE: NORMAL

## 2024-01-15 ENCOUNTER — OFFICE VISIT (OUTPATIENT)
Age: 64
End: 2024-01-15
Payer: COMMERCIAL

## 2024-01-15 ENCOUNTER — TELEPHONE (OUTPATIENT)
Age: 64
End: 2024-01-15

## 2024-01-15 VITALS
HEIGHT: 66 IN | HEART RATE: 78 BPM | TEMPERATURE: 98 F | RESPIRATION RATE: 16 BRPM | BODY MASS INDEX: 25.71 KG/M2 | OXYGEN SATURATION: 98 % | WEIGHT: 160 LBS | DIASTOLIC BLOOD PRESSURE: 78 MMHG | SYSTOLIC BLOOD PRESSURE: 124 MMHG

## 2024-01-15 DIAGNOSIS — R51.9 INTRACTABLE EPISODIC HEADACHE, UNSPECIFIED HEADACHE TYPE: ICD-10-CM

## 2024-01-15 DIAGNOSIS — G44.83 COUGH HEADACHE: Primary | ICD-10-CM

## 2024-01-15 DIAGNOSIS — R42 DIZZINESS: ICD-10-CM

## 2024-01-15 PROCEDURE — 99205 OFFICE O/P NEW HI 60 MIN: CPT | Performed by: NURSE PRACTITIONER

## 2024-01-15 RX ORDER — LORATADINE 10 MG/1
10 TABLET ORAL DAILY
COMMUNITY

## 2024-01-15 RX ORDER — TOPIRAMATE 100 MG/1
100 TABLET, FILM COATED ORAL
Qty: 30 TABLET | Refills: 4 | Status: SHIPPED | OUTPATIENT
Start: 2024-01-15

## 2024-01-15 RX ORDER — TOPIRAMATE 25 MG/1
TABLET ORAL
Qty: 42 TABLET | Refills: 0 | Status: SHIPPED | OUTPATIENT
Start: 2024-01-15

## 2024-01-15 RX ORDER — BUDESONIDE AND FORMOTEROL FUMARATE DIHYDRATE 160; 4.5 UG/1; UG/1
2 AEROSOL RESPIRATORY (INHALATION) 2 TIMES DAILY
COMMUNITY
Start: 2023-12-01

## 2024-01-15 NOTE — TELEPHONE ENCOUNTER
Submitted online for PA ans testing [id number]  No pa required    Hold prior to testing   Loratadine-3 days  Topirmate-4 days  Metoprolol-1 day

## 2024-01-16 RX ORDER — TOPIRAMATE 25 MG/1
TABLET ORAL
Qty: 135 TABLET | OUTPATIENT
Start: 2024-01-16

## 2024-01-16 NOTE — PROGRESS NOTES
Nicole Markham is a 63 y.o. female who presents with the following  Chief Complaint   Patient presents with    New Patient     Patient presents with C/O coughing headaches, she states that she has been coughing for years and has had many test including appts to ENT, PCP, Pulmonary.        HPI        New patient comes in for complaints of coughing headache  She has been seen by pulmonary, ENT and primary care  Nobody has been able to figure anything out  She is currently taking Eliquis and metoprolol  These are not help  She has been on Lyrica through the ENT for about 2 months 75 mg twice daily  This has not helped either    She states she has been coughing for years and has had many appointments and evaluations with no results  She will get the worst headache of her life after her coughing spell  During it she turns red and has trouble with pain relief afterwards  The headache can last anywhere from hours to longer  They symptoms are inconsistent when they happen   Nothing can trigger  Nothing helps them during or after the spell.   Possibly a vagal nerve trigger   She has no headaches when she is bearing down or sneezing   Not when exerting either.     She is a  at [employer]     Allergies   Allergen Reactions    Oxycodone-Acetaminophen Swelling     Swelling of the tongue. Tolerates all other opiods.        Current Outpatient Medications   Medication Sig Dispense Refill    loratadine (CLARITIN) 10 MG tablet Take 1 tablet by mouth daily      budesonide-formoterol (SYMBICORT) 160-4.5 MCG/ACT AERO Inhale 2 puffs into the lungs 2 times daily      topiramate (TOPAMAX) 25 MG tablet 25 mg nightly for 1 week then, 50 mg nightly for 1 week then, 75 mg nightly for 1 week then, 100mg nightly thereafter. 42 tablet 0    topiramate (TOPAMAX) 100 MG tablet Take 1 tablet by mouth nightly 30 tablet 4    acetaminophen (TYLENOL) 500 MG tablet Take 2 tablets by mouth every 6 hours as needed      apixaban

## 2024-01-23 ENCOUNTER — HOSPITAL ENCOUNTER (OUTPATIENT)
Facility: HOSPITAL | Age: 64
Discharge: HOME | End: 2024-01-26
Payer: COMMERCIAL

## 2024-01-23 VITALS — WEIGHT: 160 LBS | BODY MASS INDEX: 25.82 KG/M2

## 2024-01-23 DIAGNOSIS — R51.9 INTRACTABLE EPISODIC HEADACHE, UNSPECIFIED HEADACHE TYPE: ICD-10-CM

## 2024-01-23 DIAGNOSIS — R42 DIZZINESS: ICD-10-CM

## 2024-01-23 DIAGNOSIS — G44.83 COUGH HEADACHE: ICD-10-CM

## 2024-01-23 PROCEDURE — 6360000004 HC RX CONTRAST MEDICATION: Performed by: RADIOLOGY

## 2024-01-23 PROCEDURE — A9579 GAD-BASE MR CONTRAST NOS,1ML: HCPCS | Performed by: RADIOLOGY

## 2024-01-23 PROCEDURE — 70553 MRI BRAIN STEM W/O & W/DYE: CPT

## 2024-01-23 RX ADMIN — GADOTERIDOL 14 ML: 279.3 INJECTION, SOLUTION INTRAVENOUS at 14:49

## 2024-02-20 RX ORDER — TOPIRAMATE 25 MG/1
TABLET ORAL
Qty: 135 TABLET | OUTPATIENT
Start: 2024-02-20

## 2024-02-29 ENCOUNTER — TELEPHONE (OUTPATIENT)
Age: 64
End: 2024-02-29

## 2024-03-18 ENCOUNTER — TELEPHONE (OUTPATIENT)
Age: 64
End: 2024-03-18

## 2024-07-11 ENCOUNTER — TELEPHONE (OUTPATIENT)
Age: 64
End: 2024-07-11

## 2024-07-24 ENCOUNTER — PROCEDURE VISIT (OUTPATIENT)
Age: 64
End: 2024-07-24
Payer: COMMERCIAL

## 2024-07-24 DIAGNOSIS — G44.83 COUGH HEADACHE: ICD-10-CM

## 2024-07-24 DIAGNOSIS — R42 DIZZINESS: Primary | ICD-10-CM

## 2024-07-24 PROCEDURE — 95923 AUTONOMIC NRV SYST FUNJ TEST: CPT | Performed by: PSYCHIATRY & NEUROLOGY

## 2024-07-24 PROCEDURE — 95924 ANS PARASYMP & SYMP W/TILT: CPT | Performed by: PSYCHIATRY & NEUROLOGY

## 2024-07-26 NOTE — PROGRESS NOTES
[facility]  [address], [address]  [place] [address]    Patient ID:  Nicole Markham  [id number]  63 y.o.  1960     REFERRED BY: Parviz Fernandez NP  PCP: Edward Padilla MD    Date of Testin2024       Indication/History:    Episodes of headache, coughing spells (+) Breat CA s/p chemo and radiation (+) DVT    Patient is coming for syncope/autonomic dysfunction evaluation.    Medications taken 48 hrs before the test: Eliquis     Procedure: This Autonomic Nervous System (ANS) testing is performed by utilizing [employer] [facility] Work Autonomic System, with established protocol.  Multiple procedures performed: Heart rate response to deep breathing (HRDB), Valsalva ratio, Heart rate and BP response to head up tilt (HUT), and Quantitative sudomotor axon reflex testing (QSWEAT) .     Result:  Heart response to deep  breathing (HRDB): 2 series of 6 cycles were performed and the mean of 6 consecutive cycles was obtained. Average HR difference was reduced (30/8; Normal > 7). E:I ratio was reduced (1.05; Normal > 1.07).    Heart rate response to Valsalva maneuver:  Asau-nu-rtyv BP to Valsalva was measured and BP response in all 4 phases was normal. Heart rate response was reduced ( VR = 1.32; Normal > 1.39 )  HUT (head-up tilt) : Cbox-ke-ltfi BP and HR were measured, up to 15 minutes post tilt.  No significant BP reduction or HR acceleration/deceleration.  SUDOMOTOR: QSWEAT response showed relatively preserved sweat production in all 4 localities (forearm, proximal leg, distal leg, foot) of the right upper and lower limbs, comparing patient to age group.     Impression:   ABNORMAL    Reduced heart rate response to both deep breathing and Valsalva maneuver suggestive of cardiovagal (parasympathetic) dysfunction in a patient with history of Breast CA s/p radiation.         Romero Keith MD  Diplomate, American Board of Psychiatry and Neurology  Diplomate, Neuromuscular

## 2024-08-16 ENCOUNTER — PATIENT MESSAGE (OUTPATIENT)
Age: 64
End: 2024-08-16

## 2024-09-24 ENCOUNTER — OFFICE VISIT (OUTPATIENT)
Age: 64
End: 2024-09-24
Payer: COMMERCIAL

## 2024-09-24 VITALS
TEMPERATURE: 95 F | SYSTOLIC BLOOD PRESSURE: 110 MMHG | HEIGHT: 66 IN | DIASTOLIC BLOOD PRESSURE: 60 MMHG | HEART RATE: 55 BPM | OXYGEN SATURATION: 98 % | BODY MASS INDEX: 25.82 KG/M2

## 2024-09-24 DIAGNOSIS — R51.9 INTRACTABLE EPISODIC HEADACHE, UNSPECIFIED HEADACHE TYPE: ICD-10-CM

## 2024-09-24 DIAGNOSIS — S04.899S: ICD-10-CM

## 2024-09-24 DIAGNOSIS — G44.83 COUGH HEADACHE: Primary | ICD-10-CM

## 2024-09-24 PROCEDURE — 99215 OFFICE O/P EST HI 40 MIN: CPT | Performed by: PSYCHIATRY & NEUROLOGY

## 2024-09-24 RX ORDER — GABAPENTIN 300 MG/1
300 CAPSULE ORAL 2 TIMES DAILY
Qty: 180 CAPSULE | Refills: 1 | Status: SHIPPED | OUTPATIENT
Start: 2024-09-24 | End: 2025-03-23

## 2024-09-24 RX ORDER — CODEINE PHOSPHATE/GUAIFENESIN 10-100MG/5
5 LIQUID (ML) ORAL 2 TIMES DAILY PRN
Qty: 237 ML | Refills: 2 | Status: SHIPPED | OUTPATIENT
Start: 2024-09-24 | End: 2024-12-23

## 2025-01-09 NOTE — PROGRESS NOTES
Neurology Progress Note    Patient ID:  Nicolesukhwinder Markham  [id number]  64 y.o.  1960      Subjective:   History:  Ms. Markham with history of depression, breast CA 2009 s/p radiaion on the R chest area and chemo, HTN, clot in mesentary on Eliquis, previously seen by Parviz Fernandez NP for coughing and headache seen by pulmonologist, ENT. Treated with Lyrica, Gabapentin, Topamax, Metoprolol. 6 months after radiation will have palpitations. Saw cardiologist and placed on Metoprolol with some benefit. In 2018, patient started coughing every day. ENT said everything looks good. Tried Gabapentin 300 mg BID for 1 month with no clear benefit.  Also started having headaches, on the top of the head, brief 9/10 when she coughs.  Uses a cough drop. Also tried cough syrups. Already saw [facility] neurosurgeon for Arnold Chiari I malformation and did not need surgery. ANS testing (7/26/24): Reduced heart rate response to both deep breathing and Valsalva maneuver suggestive of cardiovagal (parasympathetic) dysfunction in a patient with history of Breast CA s/p radiation.     Since last time, patient back to taking Gabapentin 300 mg BID with benefit. Also takes Guaifenesin with Codeine 5 ml BID prn to suppress cough.        ROS:  Per HPI-  Otherwise the remainder of ROS was negative    Social Hx  Social History     Socioeconomic History    Marital status:    Tobacco Use    Smoking status: Never     Passive exposure: Never    Smokeless tobacco: Never   Substance and Sexual Activity    Alcohol use: Yes     Alcohol/week: 0.8 standard drinks of alcohol    Drug use: No       Meds:  Current Outpatient Medications on File Prior to Visit   Medication Sig Dispense Refill    loratadine (CLARITIN) 10 MG tablet Take 1 tablet by mouth daily      budesonide-formoterol (SYMBICORT) 160-4.5 MCG/ACT AERO Inhale 2 puffs into the lungs 2 times daily      acetaminophen (TYLENOL) 500 MG tablet Take 2 tablets by mouth every 6 hours as needed

## 2025-01-10 ENCOUNTER — OFFICE VISIT (OUTPATIENT)
Age: 65
End: 2025-01-10
Payer: COMMERCIAL

## 2025-01-10 VITALS
TEMPERATURE: 95.5 F | OXYGEN SATURATION: 97 % | BODY MASS INDEX: 25.82 KG/M2 | HEIGHT: 66 IN | DIASTOLIC BLOOD PRESSURE: 60 MMHG | SYSTOLIC BLOOD PRESSURE: 110 MMHG | HEART RATE: 53 BPM

## 2025-01-10 DIAGNOSIS — G44.83 COUGH HEADACHE: ICD-10-CM

## 2025-01-10 DIAGNOSIS — R51.9 INTRACTABLE EPISODIC HEADACHE, UNSPECIFIED HEADACHE TYPE: ICD-10-CM

## 2025-01-10 PROCEDURE — 99214 OFFICE O/P EST MOD 30 MIN: CPT | Performed by: PSYCHIATRY & NEUROLOGY

## 2025-01-10 RX ORDER — GABAPENTIN 300 MG/1
300 CAPSULE ORAL 2 TIMES DAILY
Qty: 180 CAPSULE | Refills: 1 | Status: SHIPPED | OUTPATIENT
Start: 2025-01-10 | End: 2025-07-09

## 2025-05-08 DIAGNOSIS — R51.9 INTRACTABLE EPISODIC HEADACHE, UNSPECIFIED HEADACHE TYPE: ICD-10-CM

## 2025-05-08 DIAGNOSIS — G44.83 COUGH HEADACHE: ICD-10-CM

## 2025-05-12 RX ORDER — CODEINE PHOSPHATE AND GUAIFENESIN 10; 100 MG/5ML; MG/5ML
5 SOLUTION ORAL 2 TIMES DAILY PRN
Qty: 237 ML | Refills: 1 | Status: SHIPPED | OUTPATIENT
Start: 2025-05-12 | End: 2025-06-28

## 2025-07-25 DIAGNOSIS — R51.9 INTRACTABLE EPISODIC HEADACHE, UNSPECIFIED HEADACHE TYPE: ICD-10-CM

## 2025-07-25 DIAGNOSIS — G44.83 COUGH HEADACHE: ICD-10-CM

## 2025-07-28 RX ORDER — GABAPENTIN 300 MG/1
300 CAPSULE ORAL 2 TIMES DAILY
Qty: 180 CAPSULE | Refills: 1 | Status: SHIPPED | OUTPATIENT
Start: 2025-07-28 | End: 2026-01-24

## (undated) DEVICE — Device

## (undated) DEVICE — SYR 3ML LL TIP 1/10ML GRAD --

## (undated) DEVICE — AIRLIFE™ CORRUGATED FLEXIBLE EVA TUBING FOR AEROSOL AND IPPB USE, SEGMENTED, 6 FEET (1.8 M) LENGTH, 22 MM I.D.: Brand: AIRLIFE™

## (undated) DEVICE — BITEBLOCK ENDOSCP 60FR MAXI WHT POLYETH STURDY W/ VELC WVN

## (undated) DEVICE — SYRINGE MED 10CC ECC TIP W/O NDL

## (undated) DEVICE — TRAP,MUCUS SPECIMEN, 80CC: Brand: MEDLINE

## (undated) DEVICE — ELECTRODE,RADIOTRANSLUCENT,FOAM,3PK: Brand: MEDLINE

## (undated) DEVICE — SIMPLICITY FLUFF UNDERPAD 23X36, MODERATE: Brand: SIMPLICITY

## (undated) DEVICE — BAG BELONG PT PERS CLEAR HANDL

## (undated) DEVICE — SOLIDIFIER MEDC 1200ML -- CONVERT TO 356117

## (undated) DEVICE — (D)SENSOR RMFG 02 PULS OXMTR -- DISC BY MFR USE ITEM 133445

## (undated) DEVICE — BRUSH CYTO BRONCHSCP 1.5/140MM -- CELLEBRITY

## (undated) DEVICE — BLUNTFILL: Brand: MONOJECT

## (undated) DEVICE — BAG SPEC BIOHZRD 10 X 10 IN --

## (undated) DEVICE — SYR 5ML 1/5 GRAD LL NSAF LF --

## (undated) DEVICE — KIT COLON W/ 1.1OZ LUB AND 2 END

## (undated) DEVICE — BLUNTFILL WITH FILTER: Brand: MONOJECT

## (undated) DEVICE — SET ADMIN 16ML TBNG L100IN 2 Y INJ SITE IV PIGGY BK DISP

## (undated) DEVICE — CUFF RMFG BP INF SZ 11 DISP -- LAWSON OEM ITEM 238915

## (undated) DEVICE — 1200 GUARD II KIT W/5MM TUBE W/O VAC TUBE: Brand: GUARDIAN

## (undated) DEVICE — CATH IV AUTOGRD BC PNK 20GA 25 -- INSYTE